# Patient Record
Sex: MALE | Race: WHITE
[De-identification: names, ages, dates, MRNs, and addresses within clinical notes are randomized per-mention and may not be internally consistent; named-entity substitution may affect disease eponyms.]

---

## 2020-03-23 ENCOUNTER — HOSPITAL ENCOUNTER (OUTPATIENT)
Dept: HOSPITAL 35 - SJCVCIMAG | Age: 65
End: 2020-03-23
Attending: INTERNAL MEDICINE
Payer: COMMERCIAL

## 2020-03-23 DIAGNOSIS — Z79.899: ICD-10-CM

## 2020-03-23 DIAGNOSIS — I25.10: Primary | ICD-10-CM

## 2020-03-23 DIAGNOSIS — I25.2: ICD-10-CM

## 2020-03-27 ENCOUNTER — HOSPITAL ENCOUNTER (OUTPATIENT)
Dept: HOSPITAL 35 - CATH | Age: 65
Setting detail: OBSERVATION
LOS: 1 days | Discharge: HOME | End: 2020-03-28
Attending: INTERNAL MEDICINE | Admitting: INTERNAL MEDICINE
Payer: COMMERCIAL

## 2020-03-27 VITALS — SYSTOLIC BLOOD PRESSURE: 144 MMHG | DIASTOLIC BLOOD PRESSURE: 76 MMHG

## 2020-03-27 VITALS — SYSTOLIC BLOOD PRESSURE: 131 MMHG | DIASTOLIC BLOOD PRESSURE: 60 MMHG

## 2020-03-27 VITALS — SYSTOLIC BLOOD PRESSURE: 141 MMHG | DIASTOLIC BLOOD PRESSURE: 81 MMHG

## 2020-03-27 VITALS — DIASTOLIC BLOOD PRESSURE: 80 MMHG | SYSTOLIC BLOOD PRESSURE: 149 MMHG

## 2020-03-27 VITALS — SYSTOLIC BLOOD PRESSURE: 131 MMHG | DIASTOLIC BLOOD PRESSURE: 74 MMHG

## 2020-03-27 VITALS — SYSTOLIC BLOOD PRESSURE: 156 MMHG | DIASTOLIC BLOOD PRESSURE: 88 MMHG

## 2020-03-27 VITALS — SYSTOLIC BLOOD PRESSURE: 132 MMHG | DIASTOLIC BLOOD PRESSURE: 73 MMHG

## 2020-03-27 VITALS — DIASTOLIC BLOOD PRESSURE: 83 MMHG | SYSTOLIC BLOOD PRESSURE: 124 MMHG

## 2020-03-27 VITALS — DIASTOLIC BLOOD PRESSURE: 84 MMHG | SYSTOLIC BLOOD PRESSURE: 130 MMHG

## 2020-03-27 VITALS — WEIGHT: 220 LBS | BODY MASS INDEX: 31.5 KG/M2 | HEIGHT: 70 IN

## 2020-03-27 VITALS — DIASTOLIC BLOOD PRESSURE: 80 MMHG | SYSTOLIC BLOOD PRESSURE: 125 MMHG

## 2020-03-27 DIAGNOSIS — I25.110: Primary | ICD-10-CM

## 2020-03-27 DIAGNOSIS — I10: ICD-10-CM

## 2020-03-27 LAB
ANION GAP SERPL CALC-SCNC: 10 MMOL/L (ref 7–16)
BUN SERPL-MCNC: 29 MG/DL (ref 7–18)
CALCIUM SERPL-MCNC: 9 MG/DL (ref 8.5–10.1)
CHLORIDE SERPL-SCNC: 100 MMOL/L (ref 98–107)
CO2 SERPL-SCNC: 27 MMOL/L (ref 21–32)
CREAT SERPL-MCNC: 1 MG/DL (ref 0.7–1.3)
ERYTHROCYTE [DISTWIDTH] IN BLOOD BY AUTOMATED COUNT: 12.2 % (ref 10.5–14.5)
GLUCOSE SERPL-MCNC: 110 MG/DL (ref 74–106)
HCT VFR BLD CALC: 41.2 % (ref 42–52)
HGB BLD-MCNC: 14 GM/DL (ref 14–18)
MCH RBC QN AUTO: 33.4 PG (ref 26–34)
MCHC RBC AUTO-ENTMCNC: 34 G/DL (ref 28–37)
MCV RBC: 98.3 FL (ref 80–100)
PLATELET # BLD: 210 THOU/UL (ref 150–400)
POTASSIUM SERPL-SCNC: 3.9 MMOL/L (ref 3.5–5.1)
RBC # BLD AUTO: 4.19 MIL/UL (ref 4.5–6)
SODIUM SERPL-SCNC: 137 MMOL/L (ref 136–145)
WBC # BLD AUTO: 6.3 THOU/UL (ref 4–11)

## 2020-03-27 NOTE — EKG
[facility]
[place]   [address]                     ELECTROCARDIOGRAM REPORT      
_______________________________________________________________________________
 
Name:       SHIVANI CHUA             Room #:         209-P       [facility] 
M.R.#:      [id number]                       Account #:      [id number]  
Admission:  20    Attend Phys:    Saul Larkin MD       
Discharge:              Date of Birth:  [birth year]  
                                                          Report #: [id number]
                                                                    [id number]
_______________________________________________________________________________
THIS REPORT FOR:  
 
cc:  Ana Hoffman Ghaison F. DO Couchonnal, Luis F. MD                                            ~
THIS REPORT FOR:   //name//                          
 
                          [facility]
                                       
Test Date:    2020               Test Time:    11:19:07
Pat Name:     SHIVANI CHUA          Department:   
Patient ID:   [id number]            Room:         209
Gender:       M                        Technician:   STEPHEN CHRISTOPHER
:          1955               Requested By: Saul Larkin
Order Number: [id number] MD:   Casimiro Jones
                                 Measurements
Intervals                              Axis          
Rate:         53                       P:            46
TN:           150                      QRS:          -49
QRSD:         92                       T:            57
QT:           461                                    
QTc:          433                                    
                           Interpretive Statements
Sinus rhythm
Left anterior fascicular block
Low voltage, extremity and precordial leads
Anteroseptal infarct, old
Compared to ECG 2020 07:34:44
Left anterior fascicular block now present
Low QRS voltage now present
Myocardial infarct finding now present
Poor R-wave progression no longer present
 
Electronically Signed On 3- 13:41:53 CDT by Casimiro Jones
[email or web address]
 
 
 
 
 
 
 
 
 
  <ELECTRONICALLY SIGNED>
   By: Casimiro Jones MD        
  20     1341
D: 20 1119                           _____________________________________
T: 20 1119                           Casimiro Jones MD          /EPI

## 2020-03-27 NOTE — NUR
PT ARRIVED TO  AT 1030, ASSESSED, VSS, R GROIN CHECKED WITH CATH LAB
RN, CDI, NO HEMATOMA ON PALPATION, PT STATED HE'S NOT HAVING PAIN NOW. ON
BEDREST, PT UNDERSTANDS POC, CALL LIGHT IN LAP, HEAD OF BED AT 30, PT
AMBULATED ROOM WITH STEADY GAIT, GROIN REASSESSED, STILL CDI, NO HEMATOMA, IVF
RUNNING AT 100CC/HR TILL 1500. REPORT GIVEN TO SONI JULIO.

## 2020-03-27 NOTE — EKG
[facility]
[place]   [address]                     ELECTROCARDIOGRAM REPORT      
_______________________________________________________________________________
 
Name:       SHIVANI CHUA             Room #:                     [facility]#:      [id number]                       Account #:      [id number]  
Admission:  20    Attend Phys:    Saul Larkin MD       
Discharge:              Date of Birth:  [birth year]  
                                                          Report #: [id number]
                                                                    [id number]
_______________________________________________________________________________
THIS REPORT FOR:  
 
cc:  Ana Hoffman Ghaison F. DO Lundgren, Craig H. MD [facility]                                        ~
THIS REPORT FOR:   //name//                          
 
                          [facility]
                                       
Test Date:    2020               Test Time:    07:34:44
Pat Name:     SHIVANI CHUA          Department:   
Patient ID:   [id number]            Room:          
Gender:                               Technician:   FÉLIX
:          1955               Requested By: Saul Larkin
Order Number: [id number] MD:   Doc Larry
                                 Measurements
Intervals                              Axis          
Rate:         56                       P:            52
MI:           148                      QRS:          -42
QRSD:         90                       T:            66
QT:           446                                    
QTc:          431                                    
                           Interpretive Statements
Sinus rhythm
Poor R wave progression
No previous ECG available for comparison
 
Electronically Signed On 3- 8:51:23 CDT by Doc Larry
[email or web address]
 
 
 
 
 
 
 
 
 
 
 
 
 
 
 
  <ELECTRONICALLY SIGNED>
   By: Doc Larry MD, FACC   
  20     0851
D: 20 0734                           _____________________________________
T: 20 0734                           Doc Larry MD, [facility]     /EPI

## 2020-03-27 NOTE — CATHLAB
[facility]
[address] [place]
[place]   [address]                   INVASIVE PROCEDURE REPORT     
_______________________________________________________________________________
 
Name:       SHIVANI CHUA             Room #:         209-P       [facility] 
M.REmely#:      [id number]                       Account #:      [id number]  
Admission:  03/27/20    Attend Phys:    Saul Larkin MD       
Discharge:              Date of Birth:  [birth year]  
                                                          Report #: [id number]
                                                                    [id number]
_______________________________________________________________________________
THIS REPORT FOR:  
 
cc:  Ana Hoffman Ghaison F. DO Park, Jin S. MD                                                   
                                                                       ~
 
--------------- APPROVED REPORT --------------
 
 
Study performed:  03/27/2020 08:28:24
 
Patient Details
Patient Status: Out-Patient                  Room #: 
The patient is a 64 year-old male
 
Event Personnel
Saul Larkin  Interventional Cardiologist, Shu Bennett RN RN, Marisel Camilo  Monitor, Alverto Vanessa RTR Scrub
 
Procedures Performed
Art Access - R femoral artery*  79101 Initial Mod Sed Same Phys/QHP 
Gr5y 854256 40448 Mod Sed Same Phys/QHP Ea 370338 Left Heart Cath 
w/or w/o Coronaries [id number] [facility] DECLAN Place w/wo Plasty Single LAD 
899119 Hemostasis w/ Mynx
 
Indication
Dyspnea, Positive stress test
 
Risk Factors
Hypercholesterolemia, Coronary Artery DiseaseHypertension
 
Previous Procedures/Diagnoses
Previous PCI, Previous MI
 
Procedure Narrative
The patient was brought electively to the Cardiac Catheterization 
Laboratory and was prepped and draped in a sterile manner. The Right 
Groin^ was infiltrated with 1% Lidocaine subcutaneous anesthesia. A 
PINNACLE 4FR Sheath #[id number] sheath was inserted into the RFA^. 
Coronary angiography was performed using coronary diagnostic 
catheters. The right coronary system was accessed and visualized with 
a JR 4 catheter. The left coronary system was accessed and visualized 
with a JL 4 catheter. The left ventricle was accessed and visualized 
with a JR 4 catheter. Left ventricular/Aortic Valve gradient assessed 
via catheter pullback. Pre-demployment femoral angiogram was 
 
 
[facility]
[address]
[place]  [address]
Phone:  [phone]                    INVASIVE PROCEDURE REPORT     
_______________________________________________________________________________
 
Name:            CHUASHIVANI BERANL             Room #:        209-P       [facility] [place]
..#:           [id number]          Account #:     [id number]  
Admission:       03/27/20         Attend Phys:   Saul Larkin MD   
Discharge:                  Date of Birth: [birth year]  
                         Report #:      [id number]
        [id number]
_______________________________________________________________________________
performed . Closure device was deployed with a 6 Fr Mynx. The patient 
tolerated the procedure well and there were no complications 
associated with the procedure. There was no hematoma.
 
Intraoperative Conscious Sedation
Sedation start time:  08:21           Case end Time:  
09:15    
Fentanyl  50 mcg    Versed  1 mg  
 
Fluoro Time:    14.10 minutes     
Dose:     DAP 09510.00 cGycm2  3418 mGy  
Contrast Type and Amount:  Omnipaque 200 ml    
 
Coronary Angiography
The patient's coronary anatomy is right dominant. 
 
Diagnostic Cath
Left Main The left main artery is a large-caliber vessel, patent with 
no flow-limiting lesions.
LAD  The LAD is a moderate size caliber vessel, traversing the 
anterior wall and wrapping around the apex. There are 2 overlapping 
stents in the proximal/mid segment of the LAD. There is a severe 
restenotic lesion in the proximal stent, 80%.
Diagonal 1 This is a small-caliber vessel, patent with no 
flow-limiting lesions.
Diagonal 2 This is a small-caliber vessel, patent with no 
flow-limiting lesions.
Circumflex Supplies 2 OM vessels.
OM1  This is a moderate size caliber vessel, supplies multiple 
branches as it travels down the lateral wall. There is mild disease 
in the proximal segment.
OM2  This is a small-caliber vessel, patent with no flow-limiting 
lesions.
Right Coronary This is a dominant vessel, patent with no 
flow-limiting lesions.
R PDA  This is a small to moderate size caliber vessel, patent with 
no flow-limiting lesions.
RPLV  This is a small to moderate size caliber vessel, patent with no 
flow-limiting lesions.
 
Left Ventriculography
Left Ventriculography was not performed.     Ejection Fraction was 
50-55% based off patient's Nuclear Cardiac Stress Test. An LVEDP was 
checked and there is no gradient across the outflow 
tract.
 
 
 
[facility]
[address]
[place]  [address]
Phone:  [phone]                    INVASIVE PROCEDURE REPORT     
_______________________________________________________________________________
 
Name:            SHIVANI CHUA             Room #:        209-P       [facility] [place]
M.R.#:           [id number]          Account #:     [id number]  
Admission:       03/27/20         Attend Phys:   Saul Larkin MD   
Discharge:                  Date of Birth: [birth year]  
                         Report #:      [id number]
        [id number]
_______________________________________________________________________________
Hemodynamics
The aortic pressure is 129/69 mmHg with a mean of 85 mmHg. The left 
ventricular pressure is 132/17 mmHg with a mean of mmHg. The left 
ventricular end diastolic pressure is 25 mmHg. 
 
PCI Technique Lesion
Percutaneous coronary intervention was performed on the proximal left 
anterior descending artery segment. The lesion stenosis prior to 
intervention was 80% with CRISTINE 3 flow. A VISTA 6FR XB 3.5 #[id number] 
Guide Catheter was used to engage the ostium. A Luge Wire .014 x 
182CM #[id number] Interventional Guidewire was used to cross the 
lesion.
 
BALLOON DILATION
A Balloon catheter Euphora NC RX 2.5 x 12 #[id number] was inserted and 
inflated up to 18atm for 15seconds.
 
STENT DEPLOYMENT
A drug-eluting stent RESOLUTE YON RX 3.0 X 15 #[id number] was inserted 
and inflated up to 16.00atm for 30seconds.
 
POST STENT DEPLOYMENT BALLOON DILATION
A Balloon catheter Euphora NC RX 3.25 x 15 #[id number] was inserted and 
inflated up to 18.00atm for 19seconds.
 
Final angiography reveals 5 % stenosis with CRISTINE 3 flow.
 
POST STENT DEPLOYMENT BALLOON DILATION
A Balloon catheter Euphora NC RX 3.25 x 15 #[id number] was inserted and 
inflated up to 18.00atm for 22seconds.
 
Conclusion
1. Successful insertion of a drug-eluting stent into the proximal LAD 
restenosis. There is a patent mid LAD stent.
2. Mild disease in a moderate size OM1 vessel.
3. Normal LV systolic function.
4. Recommend dual antiplatelet therapy and aggressive risk factor 
management.
 
 
 
 
 
 
  <ELECTRONICALLY SIGNED>
   By: Saul Larkin MD               
  03/27/20     1150
D: 03/27/20 1150                           _____________________________________
T: 03/27/20 1150                           Saul Larkin MD                 /INF

## 2020-03-27 NOTE — NUR
ASSUMED CARE OF PT AT APPROX 1300 FROM SONI ROWELL. ASSESSMENTS AS CHARTED. PT
A&OX4. NO C/O PAIN, SOA, DIZZYNESS. PT HR IN 40-50S BUT REMAINS ASYMPTOMATIC.
PLAN TO DISCHARGE IN THE MORNING IF PT IS STABLE. WILL CONTINUE TO MONITOR AND
FOLLOW POC.

## 2020-03-28 VITALS — DIASTOLIC BLOOD PRESSURE: 79 MMHG | SYSTOLIC BLOOD PRESSURE: 127 MMHG

## 2020-03-28 VITALS — SYSTOLIC BLOOD PRESSURE: 133 MMHG | DIASTOLIC BLOOD PRESSURE: 72 MMHG

## 2020-03-28 LAB
ALBUMIN SERPL-MCNC: 3.6 G/DL (ref 3.4–5)
ALT SERPL-CCNC: 29 U/L (ref 30–65)
ANION GAP SERPL CALC-SCNC: 6 MMOL/L (ref 7–16)
AST SERPL-CCNC: 25 U/L (ref 15–37)
BILIRUB SERPL-MCNC: 0.4 MG/DL
BUN SERPL-MCNC: 18 MG/DL (ref 7–18)
CALCIUM SERPL-MCNC: 8.2 MG/DL (ref 8.5–10.1)
CHLORIDE SERPL-SCNC: 103 MMOL/L (ref 98–107)
CO2 SERPL-SCNC: 28 MMOL/L (ref 21–32)
CREAT SERPL-MCNC: 0.9 MG/DL (ref 0.7–1.3)
ERYTHROCYTE [DISTWIDTH] IN BLOOD BY AUTOMATED COUNT: 12.1 % (ref 10.5–14.5)
GLUCOSE SERPL-MCNC: 103 MG/DL (ref 74–106)
HCT VFR BLD CALC: 39.1 % (ref 42–52)
HGB BLD-MCNC: 13.2 GM/DL (ref 14–18)
MCH RBC QN AUTO: 33.5 PG (ref 26–34)
MCHC RBC AUTO-ENTMCNC: 33.7 G/DL (ref 28–37)
MCV RBC: 99.3 FL (ref 80–100)
PLATELET # BLD: 184 THOU/UL (ref 150–400)
POTASSIUM SERPL-SCNC: 3.9 MMOL/L (ref 3.5–5.1)
PROT SERPL-MCNC: 6.2 G/DL (ref 6.4–8.2)
RBC # BLD AUTO: 3.94 MIL/UL (ref 4.5–6)
SODIUM SERPL-SCNC: 137 MMOL/L (ref 136–145)
WBC # BLD AUTO: 8.3 THOU/UL (ref 4–11)

## 2020-03-28 NOTE — NUR
ASSUMMED PT CARE AT [facility] 0700. PT A&O X4. ASSESSMENT AS CHARTED. FALL
PRECAUTIONS IN PLACE. PT DENIES HAVING CHEST PAIN. PT DENIES HAVING SOB. PT
DENIES HAVING ACUTE PAIN. PT DISCHARGING HOME C SELF CARE. R ARASELI C/D/I. NO
HEMATOMA. VITAL SIGNS STABLE. PT RECIEVED DISCHARGE EDUCATION. PT STATED
UNDERSTANDING C DISCHARGE EDUCATION AND DENIED HAVING FURTHER QUETIONS. PT
AMBULATES STEADY. IV DC. TELE DC. PT RECEIVED HOSPITAL TRANSPORT OFF UNIT.
PT'S WIFE PICKED UP PT. PT COMFORTABLE. PT DENIED HAVING FURTHER CONCERNS.

## 2020-03-28 NOTE — EKG
[facility]
[place]   [address]                     ELECTROCARDIOGRAM REPORT      
_______________________________________________________________________________
 
Name:       SHIVANI CHUA             Room #:         209-[facility] 
M.R.#:      [id number]                       Account #:      [id number]  
Admission:  20    Attend Phys:    Saul Larkin MD       
Discharge:  20    Date of Birth:  [birth year]  
                                                          Report #: [id number]
                                                                    [id number]
_______________________________________________________________________________
THIS REPORT FOR:  
 
cc:  Ana Hoffman Ghaison F. DO Couchonnal, Luis F. MD                                            ~
THIS REPORT FOR:   //name//                          
 
                          [facility]
                                       
Test Date:    2020               Test Time:    07:31:39
Pat Name:     SHIVANI CHUA          Department:   
Patient ID:   [id number]            Room:         209 
Gender:       M                        Technician:   AJITH MCRAE
:          1955               Requested By: Saul Larkin
Order Number: [id number] MD:   Casimiro Jones
                                 Measurements
Intervals                              Axis          
Rate:         60                       P:            66
MO:           152                      QRS:          -42
QRSD:         93                       T:            67
QT:           450                                    
QTc:          450                                    
                           Interpretive Statements
Sinus rhythm
Left axis deviation
Anterior infarct, old
Compared to ECG 2020 11:19:07
Left-axis deviation now present
Left anterior fascicular block no longer present
Myocardial infarct finding still present
 
Electronically Signed On 3- 12:13:23 CDT by Casimiro Jones
[email or web address]
 
 
 
 
 
 
 
 
 
 
 
  <ELECTRONICALLY SIGNED>
   By: Casimiro Jones MD        
  20     1213
D: 20                           _____________________________________
T: 20                           Casimiro Jones MD          /EPI

## 2021-04-12 ENCOUNTER — HOSPITAL ENCOUNTER (OUTPATIENT)
Dept: HOSPITAL 35 - SJCVCIMAG | Age: 66
End: 2021-04-12
Attending: INTERNAL MEDICINE
Payer: COMMERCIAL

## 2021-04-12 DIAGNOSIS — R94.31: ICD-10-CM

## 2021-04-12 DIAGNOSIS — I25.10: ICD-10-CM

## 2021-04-12 DIAGNOSIS — E11.9: ICD-10-CM

## 2021-04-12 DIAGNOSIS — R06.00: ICD-10-CM

## 2021-04-12 DIAGNOSIS — Z79.82: ICD-10-CM

## 2021-04-12 DIAGNOSIS — Z01.810: Primary | ICD-10-CM

## 2021-04-12 DIAGNOSIS — I25.2: ICD-10-CM

## 2021-04-12 DIAGNOSIS — I10: ICD-10-CM

## 2021-04-12 DIAGNOSIS — E78.5: ICD-10-CM

## 2021-04-12 DIAGNOSIS — Z87.891: ICD-10-CM

## 2021-04-12 DIAGNOSIS — Z79.899: ICD-10-CM

## 2021-04-12 DIAGNOSIS — Z88.5: ICD-10-CM
